# Patient Record
(demographics unavailable — no encounter records)

---

## 2025-05-14 NOTE — PHYSICAL EXAM
[MA] : MA [FreeTextEntry2] : Justine Villanueva [TextEntry] : ***General*** General Appearance: normal; Judgment and insight: normal; the patient is oriented to time, place and person; Recent and remote memory: normal; Mood and affect: normal; Respiratory effort: normal.   ***Pelvic Examination*** External genitalia: the appearance and hair distribution are normal; no lesions are appreciated. Urethra/urethral meatus: no masses or tenderness are appreciated; there is no scarring noted. Bladder: nontender; there is no fullness appreciated; no masses were palpated. Vagina: the vagina is normally rugated; a menstrual discharge is seen; no lesions are seen; pelvic support is adequate without any evidence of cystocele or rectocele. Cervix: normal in appearance; no overt lesions are seen. Uterus: Anteverted; normal in size, mobile, nontender, and well supported. Adnexa/parametria: nontender and not enlarged; no masses are palpated. A stool specimen was not indicated at this time.   ***Vaginal Discharge Evaluation*** A saline wet mount and KOH slide evaluation of the vaginal discharge were done. The discharge was menstrual; the pH was normal; the whiff test was negative; clue cells were not seen; hyphae were not seen; a moderate number of lactobacilli were seen; no white blood cells were seen; superficial cells were seen; a candida culture was sent.   ***colposcopy of the vulva*** Colposcopy of the external genitalia showed that the labia majora and labia minora were normal. She identified the lower labia majora as the location of her pain/itching/irritation. There was 5/10 vestibular tenderness of the anterior minor vestibular glands, and 5/10 vestibular tenderness of the posterior minor vestibular glands. There was no evidence of other dermatopathology. There was no erythema of the introitus.

## 2025-05-14 NOTE — ASSESSMENT
[TextEntry] : Since September 2024, the patient has had recurrent bouts of itching, discharge, irritation and pain with no odor.  Treatment with fluconazole did not help and vaginitis panels were negative for Candida and positive for Gardnerella.  She was treated on several occasions with oral Flagyl or MetroGel.  At times she got relief but the symptoms recurred.  Most recently, she was treated with MetroGel, probiotics and 2 weeks of boric acid suppositories (last dose 4 days ago.  She still has vulvar irritation.  There is no evidence of BV (by Amsel's criteria) and lactobacilli are seen.  Given the fact that she never had an odor, and BV does not cause itching, irritation or pain, it is unlikely that she ever had BV.  There is no evidence of Candida (pending culture).  Consideration is given to the possibility that she had/has resistant, nonalbicans Candida and was possibly successfully treated with 14 days of boric acid suppositories.  However, she still has symptoms.  Consideration is also given to the possibility that she has contact dermatitis. 46 minutes of total time was spent on the date of the encounter. This included time for review of medical records and laboratory results, face to face time (including the physical examination counseling and coordination of care), time for patient education, treatment plans, answering questions, communicating with other doctors and for medical record documentation.  The time spent is separate from time spent on separately billed procedures.

## 2025-05-14 NOTE — PLAN
[FreeTextEntry1] : I recommended that she call for the results of her yeast culture in 1 week.  I recommended that she avoid irritants and gave her a list of irritants to avoid.  I told her to try using Vaseline to improve the vulvas barrier function.  I gave her triamcinolone 0.1% ointment to use (a pea-sized amount) twice daily for 7 to 10 days.  I also recommended that she return for a follow up evaluation in one month.